# Patient Record
(demographics unavailable — no encounter records)

---

## 2025-04-18 NOTE — ASSESSMENT
[FreeTextEntry1] : 93-year-old female here for evaluate status post left foot injury.  She was seen at the urgent care/hospital where x-rays taken confirmed acute closed obliquely oriented fracture metatarsal distal diaphysis.  She was placed into a postoperative shoe and was advised follow-up in orthopedic specialist.  She reports her pain is well-controlled at this time.  Denies any numbness or tingling.  Denies any infection.  physical examination of the foot: Mild swelling appreciated laterally.  No ecchymosis or erythema appreciated skin is intact.  Tender to palpation at the distal aspect of the fifth metatarsal of the left foot.  No tenderness of the other metatarsals or Lisfranc.  No tenderness of the phalanges.  Good range of motion of the foot and ankle.  Achilles tendon is palpable and intact.  Negative Monterroso's.  Calf soft and nontender.  Mild antalgic gait.  Sensation is intact distally.  Neuro vas intact.     X-rays  of the foot reviewed in the office today reveal Obliquely oriented fracture of the fifth metatarsal mid to distal diaphysis with mild lateral apex angulation.   TX: Patient has Obliquely oriented fracture of the fifth metatarsal mid to distal diaphysis with mild lateral apex angulation. Placed which remained in her postoperative shoe which she will continue to utilize moving forwards.  y.  Advised patient that she may remain weightbearing as tolerated at this time.  She understands these fractures take a total of 4 to 6 weeks to fully heal.  I recommended patient rest, ice, compress, and elevate the foot regularly. Encouraged activity modification as tolerable. Encouraged gentle range of motion to avoid stiffness.  All questions and concerns addressed to patient's satisfaction. Patient expresses full understanding of treatment plan. Give the patient follow-up in 2-3 weeks with Dr. Rubin for repeat x-rays and further evaluation/treatment.

## 2025-06-26 NOTE — PHYSICAL EXAM
[FreeTextEntry1] : Mental Status: Alert and Attentive, oriented to person, place, and time. Speech is fluent with intact comprehension, no dysarthria. Recent and remote memory intact. Insight and judgment were intact, and the affect was normal. Follows commands. Attention/concentration intact. Fund of knowledge appropriate.   Cranial Nerves: VFF to confrontation, EOMi w/o nystagmus, pupils equally round and reactive to light, V1-V3 intact bilaterally and symmetric, hyperesthesia to R V2 on LT, face is symmetric with normal eye closure and smile, hearing is bilaterally intact to finger rub, uvula is midline and soft palate rises symmetrically, head turning, and shoulder shrug are symmetric, tongue protrudes midline.   Motor: MRC grading 5/5 bilateral UE/LE except limited ROM to LUE 5-/5 proximally d/t chronic shoulder pain.  strength 5/5. Normal tone and bulk. No pronator drift. Rapid alternating movements intact and symmetric.   Sensory exam: Sensation intact to light touch bilaterally in all extremities. No neglect or extinction on double simultaneous testing.   Coordination: No dysmetria on finger-to-nose.   Gait: Deferred, presented in wheelchair.    Deep tendon reflexes: Biceps right 2+. Biceps left 2+. Brachioradialis right 2+. Brachioradialis left 2+. Patella right 2+. Patella left 2+.

## 2025-06-26 NOTE — DATA REVIEWED
[de-identified] : 10/20/22 MRI: 1. No acute infarct or intracranial hemorrhage.  2. Moderate chronic microvascular changes.  3. 9 mm cystic appearing left parotid lesion demonstrating apparent significant decrease in size since 2015. Correlate with interval treatment.  [de-identified] : 10/19/22 CTH: No evidence of acute territorial infarct, intracranial hemorrhage, mass effect, or midline shift.   10/19/22: CTP: No evidence of perfusion abnormality.  10/19/22 CTA H/N: No evidence of flow-limiting stenosis, occlusion or aneurysm.  No evidence of carotid or vertebral artery stenosis.

## 2025-06-26 NOTE — ASSESSMENT
[FreeTextEntry1] : Ms. Martínez is a 93-year-old RH woman with PMH of tobacco use (51 pack years), DM, RCC, HLD, OA, and HTN, who presents today, accompanied by daughter, for a TIA follow up.  # TIA: Initially presented to the ED 10/19/22 after a transient episode of aphasia. MRI: No acute infarct. CTH: No acute intracranial pathology. CTA H/N: No flow-limiting stenosis. TSH: 3.61, LDL: 55. NIHSS: 0 mRS: 2.   Plan - Cont. Clopidogrel as prescribed - Cont. Atorvastatin as prescribed, LDL goal <70 - Optimize DM HgA1C goal <7.0%  - Optimize BP, long term goal < 140/80 mm Hg  - F/U cardio as scheduled - Discussed lifestyle modifications including diet and exercise - Counselled on f/u with PCP regarding routine health maintenance, prevention, screenings, and risk factor management  - Counselled on signs & symptoms of stroke: BE FAST and ED return precautions    # Trigeminal neuralgia vs less likely GCA.  - Advised can incr. Gabapentin to 2-3x daily as tolerated - Obtain MRI w/w/o to r/o underlying pathology  - Obtain ESR/CRP to r/o GCA given age  - Cont. soft diet   F/U in 4 mos. or sooner if needed

## 2025-06-26 NOTE — HISTORY OF PRESENT ILLNESS
[FreeTextEntry1] : Interval hx: Ms. Martínez is a 93-year-old RH woman, who presents today, accompanied by daughter for a TIA follow up.  Since last visit, denies any recurrence of previous symptoms. Endorses an electric shock like pain on the R side of her face that radiates down into her gums and teeth that has been ongoing since February. States R cheek is sensitive to very light touch and pain worsens with eating. Daughter states she has been eating soft foods. Saw her dentist and had Xrays completed which daughter reports were negative. Additionally saw ENT as she had a previous issue with her L parotid gland that had to be removed ~8 years ago, but reports workup was negative. Denies any current headaches or fevers. Endorses compliance with her medications, tolerating well w/o issue. Currently lives alone with 24H HHA. Mostly independent of ADLs, needs some assistance with bathing and dressing. Follows with PCP regularly, last saw 1 mo. ago. Has routine blood work done every 3 mos. Saw cardiology Dr. Cancino 2 mos. ago. Recently completed PT, walks with walker at home. Now doing PT exercises independently. Usually wheelchair for outings. Denies any further complaints.  Previous hx from Dr. Roberts:  10/2023: HPI: Pt is a 92 yo F with PMhx of DM, RCC, HLD, OA, HTN, who presents with her daughter for TIA f/u. Pt had transient aphasia in 10/2022. MRI brain negative for acute ischemia, CTA head/neck without significant flow limiting stenosis. Pt denies any recurrence of symptoms. She has been using a rolling walker. Has 3 aides who rotate for 22 hr a day coverage. She is able to go to the bathroom on her own but needs showering. She has been taking plavix every other day due to uncontrollable bleeding when she cut her finger months ago.   05/2024: pt presents today with her daughter for f/u. endorses feeling well. Denies new weakness, numbness, speech or vision difficulty. Has difficulty staying asleep. Takes melatonin prn. States that she has been taking plavix daily rather than every other day as previously endorsed.